# Patient Record
Sex: FEMALE | Race: WHITE | NOT HISPANIC OR LATINO | URBAN - METROPOLITAN AREA
[De-identification: names, ages, dates, MRNs, and addresses within clinical notes are randomized per-mention and may not be internally consistent; named-entity substitution may affect disease eponyms.]

---

## 2024-07-17 ENCOUNTER — TELEPHONE (OUTPATIENT)
Dept: GENETICS | Facility: HOSPITAL | Age: 24
End: 2024-07-17
Payer: COMMERCIAL

## 2024-07-17 NOTE — TELEPHONE ENCOUNTER
Called pt to remind her of her upcoming genetic counseling appt tomorrow. Reviewed personal and maternal family history with patient. Paternal family hx was collected from pt's father's apt with his permission to share with pt.

## 2024-07-18 ENCOUNTER — CLINICAL SUPPORT (OUTPATIENT)
Dept: GENETICS | Facility: HOSPITAL | Age: 24
End: 2024-07-18
Payer: COMMERCIAL

## 2024-07-18 DIAGNOSIS — Z84.81 FAMILY HISTORY OF BRCA2 GENE POSITIVE: ICD-10-CM

## 2024-07-18 DIAGNOSIS — Z13.79 GENETIC TESTING: Primary | ICD-10-CM

## 2024-07-18 PROCEDURE — 96040: CPT | Performed by: GENETIC COUNSELOR, MS

## 2024-07-18 NOTE — PROGRESS NOTES
Magali Serrano is a 24-year-old female who was seen for genetic counseling due to a family history of breast cancer and a known BRCA2 mutation. Ms. Serrano does not have a personal history of cancer. She was 15 years old at menarche and is nulliparous. She is pre-menopausal and retains her uterus and ovaries. Her current cancer screening includes annual clinical breast exams. She has not yet had a colonoscopy. Ms. Serrano was interested in discussing her risk for a BRCA2 mutation and opted to pursue single site testing for the mutation identified in her father and grandmother. She had her blood drawn today at [facility]. Results are expected in 2-3 weeks    PERTINENT FAMILY HISTORY: (See attached pedigree)   Father:   BRCA2+ (c.7745del)  Pat Grandmother: BRCA2+ (c.7745del)  Pat Grandfather: Prostate cancer, 53     A copy of Ms. Serrano's father's and paternal grandmother's genetic test results was available for review. A copy of these test results was sent to the testing lab, [facility].     RISK ASSESSMENT: Given her family history, Ms. Serrano has a 50% chance of testing positive for the familial BRCA2 mutation. This risk assessment is based on the family history information provided at the time of the appointment and could change in the future should new information be obtained.    GENETIC COUNSELING: (30 minutes) We reviewed the family history information in detail. Cases of cancer follow three general patterns: sporadic, familial, and hereditary. While most cancer is sporadic, some cases appear to occur in family clusters. These cases are said to be familial and account for 10-20% of breast cancer cases. Familial cases may be due to a combination of shared genes and environmental factors among family members.  In even fewer families, the cancer is said to be inherited, and the genes responsible for the cancer are known.      Family histories typical of hereditary cancer syndromes usually include multiple first-  and second-degree relatives diagnosed with cancer types that define a syndrome. These cases tend to be diagnosed at younger-than-expected ages and can be bilateral or multifocal. The cancer in these families follows an autosomal dominant inheritance pattern, which indicates the likely presence of a mutation in a cancer susceptibility gene. Children and siblings of an individual believed to carry this mutation have a 50% chance of inheriting that mutation, thereby inheriting the increased risk to develop cancer. These mutations can be passed down from the maternal or the paternal lineage.     Hereditary breast cancer accounts for 5-10% of all cases of breast cancer. A significant proportion of hereditary breast cancer can be attributed to mutations in the BRCA1 and BRCA2 genes. Mutations in these genes confer an increased risk for breast cancer, ovarian cancer, male breast cancer, prostate cancer and pancreatic cancer. Women with a BRCA2 mutation have over a 60% risk of breast cancer, and up to a 29% risk of ovarian cancer. Additionally, there is as high as a 7% risk for male breast cancer and up to a 61% risk for prostate cancer. There are increased risks for pancreatic cancer and melanoma for individuals who have a BRCA2 mutation. NCCN recommendations for individuals who have a BRCA2 mutation were discussed in detail.    GENETIC TESTING:  The risks, benefits and limitations of genetic testing and implications for clinical management following testing were reviewed. DNA test results can influence decisions regarding screening, prevention, and surgical management. Genetic testing can have significant psychological implications for both individuals and families. Also discussed was the possibility of employment and insurance discrimination based on genetic test results and the laws in place to prevent this (CHRISTIE), as well as the limitations of these laws.      Ms. Serrano opted to pursue single site testing for the  known familial BRCA2 mutation. The implications of a positive or negative test result were discussed. We discussed the possibility that, in some cases, genetic test results may be ambiguous due to the identification of a genetic variant of uncertain significance (VUS).     PLAN: Genetic testing via single site testing was ordered and results are expected in 2-3 weeks. Ms. Serrano is welcome to contact us at [phone] with any questions she may have in the meantime.      Marcia Baez MS, [facility] [place] [facility]  Licensed Certified Genetic Counselor

## 2024-07-29 ENCOUNTER — DOCUMENTATION (OUTPATIENT)
Dept: GENETICS | Facility: HOSPITAL | Age: 24
End: 2024-07-29
Payer: COMMERCIAL

## 2024-07-29 ENCOUNTER — TELEPHONE (OUTPATIENT)
Dept: GENETICS | Facility: HOSPITAL | Age: 24
End: 2024-07-29
Payer: COMMERCIAL

## 2024-07-29 NOTE — TELEPHONE ENCOUNTER
Spoke with patient and disclosed negative genetic results. Informed patient these results would be on [employer] and sent to her

## 2024-07-29 NOTE — PROGRESS NOTES
Magali Serrano is a 24-year-old female who was seen for genetic counseling due to a family history of breast cancer and a known BRCA2 mutation. Ms. Serrano does not have a personal history of cancer. She was 15 years old at menarche and is nulliparous. She is pre-menopausal and retains her uterus and ovaries. Her current cancer screening includes annual clinical breast exams. She has not yet had a colonoscopy. Ms. Serrano was interested in discussing her risk for a BRCA2 mutation and opted to pursue single site testing for the mutation identified in her father and grandmother. Genetic testing was negative for the known familial mutation in the BRCA2 gene. These normal results were discussed with Ms. Serrano by telephone on 7/29/24.    PERTINENT FAMILY HISTORY: (See attached pedigree)   Father:   BRCA2+ (c.7745del)  Pat Grandmother: BRCA2+ (c.7745del)  Pat Grandfather: Prostate cancer, 53     A copy of Ms. Serrano's father's and paternal grandmother's genetic test results was available for review. A copy of these test results was sent to the testing lab, [facility].     RISK ASSESSMENT: Given her family history, Ms. Serrano has a 50% chance of testing positive for the familial BRCA2 mutation. This risk assessment is based on the family history information provided at the time of the appointment and could change in the future should new information be obtained.    GENETIC COUNSELING: (30 minutes) We reviewed the family history information in detail. Cases of cancer follow three general patterns: sporadic, familial, and hereditary. While most cancer is sporadic, some cases appear to occur in family clusters. These cases are said to be familial and account for 10-20% of breast cancer cases. Familial cases may be due to a combination of shared genes and environmental factors among family members.  In even fewer families, the cancer is said to be inherited, and the genes responsible for the cancer are known.      Family  histories typical of hereditary cancer syndromes usually include multiple first- and second-degree relatives diagnosed with cancer types that define a syndrome. These cases tend to be diagnosed at younger-than-expected ages and can be bilateral or multifocal. The cancer in these families follows an autosomal dominant inheritance pattern, which indicates the likely presence of a mutation in a cancer susceptibility gene. Children and siblings of an individual believed to carry this mutation have a 50% chance of inheriting that mutation, thereby inheriting the increased risk to develop cancer. These mutations can be passed down from the maternal or the paternal lineage.     Hereditary breast cancer accounts for 5-10% of all cases of breast cancer. A significant proportion of hereditary breast cancer can be attributed to mutations in the BRCA1 and BRCA2 genes. Mutations in these genes confer an increased risk for breast cancer, ovarian cancer, male breast cancer, prostate cancer and pancreatic cancer. Women with a BRCA2 mutation have over a 60% risk of breast cancer, and up to a 29% risk of ovarian cancer. Additionally, there is as high as a 7% risk for male breast cancer and up to a 61% risk for prostate cancer. There are increased risks for pancreatic cancer and melanoma for individuals who have a BRCA2 mutation. NCCN recommendations for individuals who have a BRCA2 mutation were discussed in detail.    GENETIC TESTING:  The risks, benefits and limitations of genetic testing and implications for clinical management following testing were reviewed. DNA test results can influence decisions regarding screening, prevention, and surgical management. Genetic testing can have significant psychological implications for both individuals and families. Also discussed was the possibility of employment and insurance discrimination based on genetic test results and the laws in place to prevent this (CHRISTIE), as well as the  limitations of these laws.      Ms. Serrano opted to pursue single site testing for the known familial BRCA2 mutation. The implications of a positive or negative test result were discussed. We discussed the possibility that, in some cases, genetic test results may be ambiguous due to the identification of a genetic variant of uncertain significance (VUS).     TEST RESULTS: Genetic testing was negative for the known familial mutation in the BRCA2 gene (see attached). Therefore, Ms. Serrano is a “true negative”. A “true negative” is when a BRCA mutation has been identified in the family, and a relative has been proven not to have inherited it. Given Ms. Serrano's negative test results, general population breast cancer screening, including monthly self-breast exams, annual clinical breast exams, and annual mammography starting at age 40 are recommended.      It is important to remember that while Ms. Serrano does not carry the familial mutation in BRCA2, other maternal relatives are still at risk to carry this mutation and the only way to know their status is to test them individually. We would be happy to see family members who live in the area in our clinic to further discuss this information and testing options. They can request a referral to [facility], and our coordinator will contact the individual to schedule an appointment once the referral is received. They can call [phone] to receive more information on how to place the referral. For family members who live elsewhere, there are genetic counselors at most [facility] centers.  They can find a genetic counselor by visiting the National Society of Genetic Counselors website at www.nsgc.org or they can call our office and we would be happy to give them the contact information of the closest genetic counselor.      PLAN:  Genetic counseling remains available to Ms. Serrano and her family. Ms. Serrano is encouraged to contact us with any  questions or concerns at [phone].      Marcia Baez MS, [facility] [place], [place], [facility]  Licensed Certified Genetic Counselor      Cc : Magali Silver, DO